# Patient Record
Sex: MALE | Race: WHITE | NOT HISPANIC OR LATINO | URBAN - METROPOLITAN AREA
[De-identification: names, ages, dates, MRNs, and addresses within clinical notes are randomized per-mention and may not be internally consistent; named-entity substitution may affect disease eponyms.]

---

## 2024-06-04 ENCOUNTER — EMERGENCY (EMERGENCY)
Facility: HOSPITAL | Age: 44
LOS: 1 days | Discharge: HOME | End: 2024-06-04
Attending: STUDENT IN AN ORGANIZED HEALTH CARE EDUCATION/TRAINING PROGRAM | Admitting: STUDENT IN AN ORGANIZED HEALTH CARE EDUCATION/TRAINING PROGRAM
Payer: OTHER GOVERNMENT

## 2024-06-04 VITALS
DIASTOLIC BLOOD PRESSURE: 88 MMHG | SYSTOLIC BLOOD PRESSURE: 158 MMHG | OXYGEN SATURATION: 99 % | TEMPERATURE: 98 F | HEIGHT: 70 IN | WEIGHT: 240.08 LBS | RESPIRATION RATE: 18 BRPM | HEART RATE: 85 BPM

## 2024-06-04 VITALS
TEMPERATURE: 98 F | SYSTOLIC BLOOD PRESSURE: 165 MMHG | RESPIRATION RATE: 17 BRPM | HEART RATE: 56 BPM | OXYGEN SATURATION: 98 % | DIASTOLIC BLOOD PRESSURE: 81 MMHG

## 2024-06-04 LAB
ADD ON TEST-SPECIMEN IN LAB: SIGNIFICANT CHANGE UP
ALBUMIN SERPL ELPH-MCNC: 4.3 G/DL — SIGNIFICANT CHANGE UP (ref 3.3–5)
ALP SERPL-CCNC: 95 U/L — SIGNIFICANT CHANGE UP (ref 40–120)
ALT FLD-CCNC: 44 U/L — HIGH (ref 4–41)
ANION GAP SERPL CALC-SCNC: 18 MMOL/L — HIGH (ref 7–14)
AST SERPL-CCNC: 37 U/L — SIGNIFICANT CHANGE UP (ref 4–40)
BILIRUB SERPL-MCNC: 0.7 MG/DL — SIGNIFICANT CHANGE UP (ref 0.2–1.2)
BUN SERPL-MCNC: 10 MG/DL — SIGNIFICANT CHANGE UP (ref 7–23)
CALCIUM SERPL-MCNC: 9.9 MG/DL — SIGNIFICANT CHANGE UP (ref 8.4–10.5)
CHLORIDE SERPL-SCNC: 102 MMOL/L — SIGNIFICANT CHANGE UP (ref 98–107)
CO2 SERPL-SCNC: 18 MMOL/L — LOW (ref 22–31)
CREAT SERPL-MCNC: 0.82 MG/DL — SIGNIFICANT CHANGE UP (ref 0.5–1.3)
EGFR: 111 ML/MIN/1.73M2 — SIGNIFICANT CHANGE UP
GLUCOSE SERPL-MCNC: 122 MG/DL — HIGH (ref 70–99)
HCT VFR BLD CALC: 42.2 % — SIGNIFICANT CHANGE UP (ref 39–50)
HGB BLD-MCNC: 15.3 G/DL — SIGNIFICANT CHANGE UP (ref 13–17)
MAGNESIUM SERPL-MCNC: 1.9 MG/DL — SIGNIFICANT CHANGE UP (ref 1.6–2.6)
MCHC RBC-ENTMCNC: 30.6 PG — SIGNIFICANT CHANGE UP (ref 27–34)
MCHC RBC-ENTMCNC: 36.3 GM/DL — HIGH (ref 32–36)
MCV RBC AUTO: 84.4 FL — SIGNIFICANT CHANGE UP (ref 80–100)
NRBC # BLD: 0 /100 WBCS — SIGNIFICANT CHANGE UP (ref 0–0)
NRBC # FLD: 0 K/UL — SIGNIFICANT CHANGE UP (ref 0–0)
PLATELET # BLD AUTO: 201 K/UL — SIGNIFICANT CHANGE UP (ref 150–400)
POTASSIUM SERPL-MCNC: 3.5 MMOL/L — SIGNIFICANT CHANGE UP (ref 3.5–5.3)
POTASSIUM SERPL-SCNC: 3.5 MMOL/L — SIGNIFICANT CHANGE UP (ref 3.5–5.3)
PROT SERPL-MCNC: 8.5 G/DL — HIGH (ref 6–8.3)
RBC # BLD: 5 M/UL — SIGNIFICANT CHANGE UP (ref 4.2–5.8)
RBC # FLD: 13.1 % — SIGNIFICANT CHANGE UP (ref 10.3–14.5)
SODIUM SERPL-SCNC: 138 MMOL/L — SIGNIFICANT CHANGE UP (ref 135–145)
WBC # BLD: 8.71 K/UL — SIGNIFICANT CHANGE UP (ref 3.8–10.5)
WBC # FLD AUTO: 8.71 K/UL — SIGNIFICANT CHANGE UP (ref 3.8–10.5)

## 2024-06-04 PROCEDURE — 99284 EMERGENCY DEPT VISIT MOD MDM: CPT

## 2024-06-04 PROCEDURE — 93010 ELECTROCARDIOGRAM REPORT: CPT

## 2024-06-04 RX ORDER — SODIUM CHLORIDE 9 MG/ML
1000 INJECTION INTRAMUSCULAR; INTRAVENOUS; SUBCUTANEOUS ONCE
Refills: 0 | Status: COMPLETED | OUTPATIENT
Start: 2024-06-04 | End: 2024-06-04

## 2024-06-04 RX ORDER — METOCLOPRAMIDE HCL 10 MG
10 TABLET ORAL ONCE
Refills: 0 | Status: COMPLETED | OUTPATIENT
Start: 2024-06-04 | End: 2024-06-04

## 2024-06-04 RX ORDER — ONDANSETRON 8 MG/1
4 TABLET, FILM COATED ORAL ONCE
Refills: 0 | Status: COMPLETED | OUTPATIENT
Start: 2024-06-04 | End: 2024-06-04

## 2024-06-04 RX ADMIN — SODIUM CHLORIDE 1000 MILLILITER(S): 9 INJECTION INTRAMUSCULAR; INTRAVENOUS; SUBCUTANEOUS at 12:18

## 2024-06-04 RX ADMIN — ONDANSETRON 4 MILLIGRAM(S): 8 TABLET, FILM COATED ORAL at 12:19

## 2024-06-04 RX ADMIN — Medication 10 MILLIGRAM(S): at 12:18

## 2024-06-04 NOTE — ED PROVIDER NOTE - NSFOLLOWUPINSTRUCTIONS_ED_ALL_ED_FT
- You were seen in the ER today for opioid withdrawal symptoms.    - You were given IV fluids for hydration, IV Zofran and IV Reglan for nausea/vomiting.     - Blood work was performed which was within normal limits, please see attached results below.    - See attached paperwork for opioid use and treatment resources.     - Please follow up with your primary care doctor in the next 48-72 hours.     - Patient return to the ER for severe abdominal pain, uncontrolled vomiting, blood in the vomit, blood in the stool, fever unresponsive to Tylenol or Motrin, no urine output in more than 12 hours, palpitations, passing out episodes, change in behavior or mental status, or other concerns.     Opioid Withdrawal Treatment    A prescription pill bottle with an example of a pill.  Opioid withdrawal is a group of symptoms that can happen if you have been taking opioids and then stop taking them. Opioid withdrawal can also happen:  When the dosage is decreased.  After stopping a prescription opioid as directed. Physical dependence can develop after taking opioids regularly for just a few days.  After taking a prescription opioid incorrectly, such as taking more than recommended or taking it for a different purpose (opioid misuse).  Taking the opioid illegally and then stopping.  Opioid withdrawal is not usually life-threatening, but it can be very uncomfortable and severe. Opioid withdrawal treatment makes managing withdrawal easier.    What are the signs and symptoms of opioid withdrawal?  Symptoms of this condition can be both physical and mental. Mild physical symptoms include:  Nausea.  Muscle aches or spasms.  Watery eyes and runny nose.  Widening of the dark centers of the eyes (dilated pupils).  Flushing and itching of the skin.  Moderate symptoms include:  Fever and sweating.  Stomach cramping and diarrhea.  Vomiting.  Increased blood pressure and fast pulse.  Mental symptoms include:  Depression.  Anxiety and nervousness.  Restlessness, irritability, and severe mood swings.  Trouble sleeping.  Increased craving for drugs.    When do symptoms start and how long do they last?  When symptoms start and how long they last depend on the kind of opioid you have been taking.  Short-acting opioids, such as heroin or oxycodone, work fast and then lose their effect quickly. Symptoms occur within hours of stopping or reducing the amount you take. The worst symptoms (peak withdrawal) occur in 2–3 days. Overall, symptoms should lessen in 7–10 days.  Long-acting opioids, such as methadone, work for a longer period of time. Symptoms can occur within 1–3 days of stopping or reducing the amount you take. The worst symptoms occur in 3–8 days. Symptoms may continue for several weeks but will be milder.  Medicines that block the effects of opioids, such as naloxone, can cause withdrawal symptoms that begin within minutes and last for about an hour. It is important to get medical help in this situation.    How is this treated?  Treatment for this condition depends on the type of opioids that are causing your symptoms. Treatment for opioid withdrawal usually happens:  In an emergency department.  In a clinic or drug treatment facility.  At home.  In a combination of different settings.    In an emergency, medicines that block the effects of opioids may be used. These medicines are called opioid antagonists. Naloxone is an example of this medicine. Naloxone is given to restore a person's breathing if it has slowed down or stopped due to an opioid overdose.  Other treatments      Counseling.   This treatment is also called talk therapy. It is provided by treatment counselors and is used in addition to medicines.    Support groups.   These groups provide emotional support, advice, and guidance during recovery. They offer a safe environment in which you can talk to present and past opioid users and those who have gone through treatment.    Follow these instructions at home:  Always check with your health care provider before starting any new medicines. Many of the medicines used to treat opioid withdrawal can have dangerous side effects and should only be taken as prescribed by your health care provider.  Do not start using an opioid medicine again without talking to a health care provider. You may be at an increased risk for problems, including opioid overdose.  If you have chronic pain, ask your health care provider about an intensive pain rehabilitation program or a referral to a chronic pain specialist. You may need to work with a pain specialist to come up with a treatment plan to help manage pain.  Keep all follow-up visits. This is important.    Contact a health care provider if:  You need help stopping use of an opioid medicine.  You have been on long-term opioids and are planning to stop, and you would like to prepare for any withdrawal symptoms.  You have been treated at home, but you are still having symptoms of withdrawal.  You have been treated for withdrawal, but you have started using opioids again (relapsed).    Get help right away if:  You have chest pain and shortness of breath.  You are drowsy and have difficulty staying awake.  You feel weak or dizzy, or feel like you are going to pass out.  You have nausea or vomiting that does not go away, or you begin to vomit blood.  You have diarrhea that does not stop, or you begin to feel weak and light-headed.  You feel severely depressed and anxious.  You feel you may be a harm to yourself or others.  These symptoms may represent a serious problem that is an emergency. Do not wait to see if the symptoms will go away. Get medical help right away. Call your local emergency services (348 in the [place] Do not drive yourself to the hospital.    If you ever feel like you may hurt yourself or others, or have thoughts about taking your own life, get help right away. Go to your nearest emergency department or:  Call your local emergency services (000 in the [place]  Call a suicide crisis helpline, such as the National Suicide Prevention Lifeline at 1-381.615.4732 or 354 in the [place] This is open 24 hours a day in the [place]  Text the Crisis Text Line at 726240 (in the [place]    Summary  Opioid withdrawal is a group of symptoms that can happen if you have been taking opioids and then stop taking them.  Opioid withdrawal symptoms are treated using medicines, counseling, and support groups.  The most effective treatment is to use an opioid or opioid-like drug to block withdrawal symptoms and gradually lower the dose over time.

## 2024-06-04 NOTE — ED PROVIDER NOTE - PHYSICAL EXAMINATION
CONSTITUTIONAL: Alert and Oriented x3, skin diaphoretic and pale, restless with increased activity  HEAD: Normocephalic, atraumatic.  EYES: clear sclera and conjunctiva, Pupils PERRL bilaterally, EOM intact.   NECK:  Airway patent. Neck Supple.  CARDIAC: Heart regular, normal S1/2, no murmurs noted. No chest wall tenderness or deformity.  RESPIRATORY: Lung sounds clear B/L, good aeration. No wheezing, rales, adventitious breath sounds. No accessory muscle use.    ABDOMEN: soft, non-distended; non-tender to palpation, bowel sounds present x4 quadrants, no masses, scars. No CVA tenderness.  MSK: Moving all extremities. Normal color and temperature. No bony or soft tissue tenderness. Strength 5+/5+ B/L upper and lower.   SKIN: pale, diaphoretic.  NEURO: alert and interactive, oriented x3, no focal deficits.  Psych: denies SI/HI/AVH. Increased activity, shifting in stretcher.

## 2024-06-04 NOTE — ED PROVIDER NOTE - CLINICAL SUMMARY MEDICAL DECISION MAKING FREE TEXT BOX
MIQUEL Naranjo NP Fellow: Patient is 45yo M PMHx opioid use/abuse presents with c/o withdrawal symptoms started around 8am this morning which feel like "I feel horrible, my body is convulsing, my skin is crawling", patient now with active vomiting. Patient denies abdominal pain, CP, SOB, HA, dizziness, body aches. Patient with increased activity, restless moving around stretcher. Physical exam pertinent for diaphoretic skin, increased activity, Pupils PERRL bilaterally, abdomen is soft, non-distended, and non-tender to palpation, Heart RRR. Patient likely with opioid withdrawal, symptoms likely worsened by Suboxone use provided by friend. COWS score for opiate withdrawal 13 for moderate withdrawal symptoms. Will order CBC and CMP to check electrolytes, IV normal saline for hydration, Zofran for nausea/vomiting, and reassess. Disposition pending improvement in symptoms. MIQUEL Naranjo NP Fellow: Patient is 43yo M PMHx opioid use/abuse presents with c/o withdrawal symptoms started around 8am this morning which feel like "I feel horrible, my body is convulsing, my skin is crawling", patient now with active vomiting. Patient denies abdominal pain, CP, SOB, HA, dizziness, body aches. Patient with increased activity, restless moving around stretcher. Physical exam pertinent for diaphoretic skin, increased activity, Pupils PERRL bilaterally, abdomen is soft, non-distended, and non-tender to palpation, Heart RRR. Patient likely with opioid withdrawal, symptoms likely worsened by Suboxone use provided by friend. COWS score for opiate withdrawal 13 for moderate withdrawal symptoms. Will order CBC and CMP to check electrolytes, IV normal saline for hydration, Zofran for nausea/vomiting, and reassess. Disposition pending improvement in symptoms.    Kei, Attending  See Attestation

## 2024-06-04 NOTE — ED ADULT TRIAGE NOTE - CHIEF COMPLAINT QUOTE
pt brought in by EMS from home complaining of withdrawals from heroin and fentanyl. Pt states he last used last night. pt complaining of nausea and vomiting. pt denies chest pain, sob.

## 2024-06-04 NOTE — ED ADULT NURSE NOTE - OBJECTIVE STATEMENT
45 y/o M AAOx4, ambulatory at baseline presenting to room 24. Pt c/o withdrawal symptoms. States he does fentanyl on and off for the last six months with a history of IV drug abuse. Pt states he takes no medications or has any current medical problems. Pt states that he used suboxone this morning that he received from a friend Pt noted to be in abdominal pain with nausea and vomiting. Diaphoresis noted at this time. States he has never experienced withdrawal symptoms like this before. Denies auditory and visual hallucinations. Denies S/I and H/I. No tremors noted at this time. Denies headache, chest pain, dizziness. #18g placed to R forearm. Labs drawn and sent as per MD order. Medications administered as per MD order.

## 2024-06-04 NOTE — ED PROVIDER NOTE - PROGRESS NOTE DETAILS
Darrel Rasmussen, EM NP Fellow: Patient with some relief of symptoms, tolerating clear liquids without vomiting. All lab results have been discussed with patient. Patient is ready for discharge home. Patient provided time to ask questions.  All questions were answered at bedside with reasons to return explained at length.

## 2024-06-04 NOTE — ED PROVIDER NOTE - PATIENT PORTAL LINK FT
You can access the FollowMyHealth Patient Portal offered by [facility] by registering at the following website: [email or web address]. By joining [facility]’s FollowMyHealth portal, you will also be able to view your health information using other applications (apps) compatible with our system.

## 2024-06-04 NOTE — ED PROVIDER NOTE - OBJECTIVE STATEMENT
Patient is 43yo M PMHx opioid use/abuse presents with c/o withdrawal symptoms started around 8am this morning. Patient endorses "I feel horrible, my body is convulsing, my skin is crawling." Patient reports last IV Fentanyl last night, reports daily intravenous use for several months, reports "multiple bags daily". Patient reports he took a Suboxone pill provided to him by a friend around 8:30 but reports this did not help his symptoms. Patient denies abdominal pain, CP, SOB, HA, dizziness, body aches. Patient denies nausea initially then proceeded to start vomiting after exam.   No PMHx  No PSHx  NKDA Patient is 45yo M PMHx opioid use/abuse presents with c/o withdrawal symptoms started around 8am this morning. Patient endorses "I feel horrible, my body is convulsing, my skin is crawling." Patient reports last IV Fentanyl last night, reports daily intravenous use for several months, reports "multiple bags daily". Patient reports he took a Suboxone pill provided to him by a friend around 8:30 but reports this did not help his symptoms. Patient reports occasional marijuana use, denies daily use, admits to vaping, denies alcohol use. Patient denies abdominal pain, CP, SOB, HA, dizziness, body aches. Patient denies nausea initially then proceeded to start vomiting after exam.   No PMHx  No PSHx  NKDA

## 2024-06-04 NOTE — ED ADULT TRIAGE NOTE - NS ED NURSE AMBULANCES
Detail Level: Detailed Quality 111:Pneumonia Vaccination Status For Older Adults: Pneumococcal Vaccination Previously Received [facility]

## 2024-06-04 NOTE — ED PROVIDER NOTE - ATTENDING APP SHARED VISIT CONTRIBUTION OF CARE
I performed a history and physical exam of the patient and discussed their management with the resident/fellow/ACP/student. I have reviewed the resident/fellow/ACP/student note and agree with the documented findings and plan of care, except as noted. I have personally performed a substantive portion of the visit including all aspects of the medical decision making. My medical decision making and observations are found above. Please refer to any progress notes for updates on clinical course.    I performed a history and physical exam of the patient and discussed their management with the resident/fellow/ACP/student. I have reviewed the resident/fellow/ACP/student note and agree with the documented findings and plan of care, except as noted. I have personally performed a substantive portion of the visit including all aspects of the medical decision making. My medical decision making and observations are found above. Please refer to any progress notes for updates on clinical course.    43 y/o male with pmhx of opioid use/abuse presenting with reported withdrawal. Symptoms started around 8 AM this morning, uses fentanyl/heroin and last used IV fentanyl last night. Reports persistent nausea with nonbloody emesis, skin crawling, diffuse body pain. Denies any other drug use other than occasional marijuana use and denies any chronic alcohol use with no history of alcohol withdrawal.  Uses IV fentanyl daily for several months.  Also took Suboxone pill this morning provided by his friend and has never taken Suboxone before.  Denies any fever/chills, diarrhea, cough, rashes, abdominal pain, chest pain, shortness of breath, dysuria, or hematuria.    Gen: WDWN, NAD, comfortable appearing, afebrile   HEENT: No nasal discharge, mucous membranes mildly dry, no oropharyngeal edema/erythema/exudates   CV: RRR, +S1/S2, no M/R/G, equal b/l radial pulses 2+  Resp: CTAB, no W/R/R, SPO2 >95% on RA, no increased WOB   GI: Abdomen soft non-distended, NTTP, no masses/organomegaly   MSK/Skin: No CVA tenderness, no open wounds, no bruising, no LE edema  Neuro: A&Ox4, moving all 4 extremities spontaneously, gross sensation intact in UE and LE BL  Psych: appropriate mood    MDM:   43 y/o male with pmhx of opioid use/abuse presenting with reported withdrawal, nausea with nonbloody emesis, skin crawling, diffuse body pain, afebrile, hemodynamically stable, abdomen soft/NTTP, not tolerating p.o. Exam/history concerning for but not limited to opioid withdrawal/Suboxone use.  Given persistent nausea and vomiting will check basic labs for signs of metabolic derangement, fluid bolus, antiemetics, and reassess